# Patient Record
Sex: FEMALE | Race: WHITE | NOT HISPANIC OR LATINO | Employment: FULL TIME | RURAL
[De-identification: names, ages, dates, MRNs, and addresses within clinical notes are randomized per-mention and may not be internally consistent; named-entity substitution may affect disease eponyms.]

---

## 2017-02-22 ENCOUNTER — HISTORICAL (OUTPATIENT)
Dept: ADMINISTRATIVE | Facility: HOSPITAL | Age: 47
End: 2017-02-22

## 2017-02-24 LAB
LAB AP GENERAL CAT - HISTORICAL: NORMAL
LAB AP INTERPRETATION/RESULT - HISTORICAL: NEGATIVE
LAB AP SPECIMEN ADEQUACY - HISTORICAL: NORMAL
LAB AP SPECIMEN SUBMITTED - HISTORICAL: NORMAL

## 2019-02-27 ENCOUNTER — HISTORICAL (OUTPATIENT)
Dept: ADMINISTRATIVE | Facility: HOSPITAL | Age: 49
End: 2019-02-27

## 2020-03-03 ENCOUNTER — HISTORICAL (OUTPATIENT)
Dept: ADMINISTRATIVE | Facility: HOSPITAL | Age: 50
End: 2020-03-03

## 2020-03-09 LAB
INSULIN SERPL-ACNC: ABNORMAL U[IU]/ML
LAB AP CLINICAL INFORMATION: NORMAL
LAB AP COMMENTS: NORMAL
LAB AP GENERAL CAT - HISTORICAL: NORMAL
LAB AP INTERPRETATION/RESULT - HISTORICAL: NEGATIVE
LAB AP SPECIMEN ADEQUACY - HISTORICAL: NORMAL
LAB AP SPECIMEN SUBMITTED - HISTORICAL: NORMAL

## 2021-03-02 ENCOUNTER — HISTORICAL (OUTPATIENT)
Dept: ADMINISTRATIVE | Facility: HOSPITAL | Age: 51
End: 2021-03-02

## 2021-03-02 LAB
25(OH)D3 SERPL-MCNC: 40 NG/ML
BASOPHILS # BLD AUTO: 0.07 X10E3/UL (ref 0–0.2)
BASOPHILS NFR BLD AUTO: 0.7 % (ref 0–1)
CANDIDA SPECIES: POSITIVE
EOSINOPHIL # BLD AUTO: 0.13 X10E3/UL (ref 0–0.5)
EOSINOPHIL NFR BLD AUTO: 1.3 % (ref 1–4)
ERYTHROCYTE [DISTWIDTH] IN BLOOD BY AUTOMATED COUNT: 13.2 % (ref 11.5–14.5)
GARDNERELLA: NEGATIVE
HCT VFR BLD AUTO: 45.3 % (ref 38–47)
HGB BLD-MCNC: 14.4 G/DL (ref 12–16)
IMM GRANULOCYTES # BLD AUTO: 0.03 X10E3/UL (ref 0–0.04)
IMM GRANULOCYTES NFR BLD: 0.3 % (ref 0–0.4)
LYMPHOCYTES # BLD AUTO: 3.2 X10E3/UL (ref 1–4.8)
LYMPHOCYTES NFR BLD AUTO: 32.9 % (ref 27–41)
MCH RBC QN AUTO: 28.5 PG (ref 27–31)
MCHC RBC AUTO-ENTMCNC: 31.8 G/DL (ref 32–36)
MCV RBC AUTO: 89.5 FL (ref 80–96)
MONOCYTES # BLD AUTO: 0.6 X10E3/UL (ref 0–0.8)
MONOCYTES NFR BLD AUTO: 6.2 % (ref 2–6)
MPC BLD CALC-MCNC: 12.2 FL (ref 9.4–12.4)
NEUTROPHILS # BLD AUTO: 5.69 X10E3/UL (ref 1.8–7.7)
NEUTROPHILS NFR BLD AUTO: 58.6 % (ref 53–65)
NRBC # BLD AUTO: 0 X10E3/UL (ref 0–0)
NRBC, AUTO (.00): 0 /100 (ref 0–0)
PLATELET # BLD AUTO: 281 X10E3/UL (ref 150–400)
RBC # BLD AUTO: 5.06 X10E6/UL (ref 4.2–5.4)
TRICHOMONAS: NEGATIVE
WBC # BLD AUTO: 9.72 X10E3/UL (ref 4.5–11)

## 2021-03-03 LAB
ALBUMIN SERPL BCP-MCNC: 4.3 G/DL (ref 3.5–5)
ALBUMIN/GLOB SERPL: 1.3 {RATIO}
ALP SERPL-CCNC: 70 U/L (ref 41–108)
ALT SERPL W P-5'-P-CCNC: 23 U/L (ref 13–56)
ANION GAP SERPL CALCULATED.3IONS-SCNC: 9 MMOL/L (ref 7–16)
AST SERPL W P-5'-P-CCNC: 13 U/L (ref 15–37)
BACTERIA #/AREA URNS HPF: ABNORMAL /HPF
BILIRUB SERPL-MCNC: 0.3 MG/DL (ref 0–1.2)
BILIRUB UR QL STRIP: NEGATIVE MG/DL
BUN SERPL-MCNC: 14 MG/DL (ref 7–18)
BUN/CREAT SERPL: 18
CALCIUM SERPL-MCNC: 8.7 MG/DL (ref 8.5–10.1)
CHLORIDE SERPL-SCNC: 105 MMOL/L (ref 98–107)
CHOLEST SERPL-MCNC: 239 MG/DL
CHOLEST/HDLC SERPL: 3.3 {RATIO}
CLARITY UR: CLEAR
CO2 SERPL-SCNC: 29 MMOL/L (ref 21–32)
COLOR UR: YELLOW
CREAT SERPL-MCNC: 0.79 MG/DL (ref 0.5–1.02)
GLOBULIN SER-MCNC: 3.2 G/DL (ref 2–4)
GLUCOSE SERPL-MCNC: 75 MG/DL (ref 74–106)
GLUCOSE UR STRIP-MCNC: NEGATIVE MG/DL
HDLC SERPL-MCNC: 73 MG/DL
KETONES UR STRIP-SCNC: NEGATIVE MG/DL
LDLC SERPL CALC-MCNC: 148 MG/DL
LEUKOCYTE ESTERASE UR QL STRIP: ABNORMAL LEU/UL
MUCOUS THREADS #/AREA URNS HPF: ABNORMAL /HPF
NITRITE UR QL STRIP: NEGATIVE
PH UR STRIP: 6.5 PH UNITS (ref 5–8)
POTASSIUM SERPL-SCNC: 4.3 MMOL/L (ref 3.5–5.1)
PROT SERPL-MCNC: 7.5 G/DL (ref 6.4–8.2)
PROT UR QL STRIP: NEGATIVE MG/DL
RBC # UR STRIP: ABNORMAL ERY/UL
RBC #/AREA URNS HPF: ABNORMAL /HPF (ref 0–3)
SODIUM SERPL-SCNC: 139 MMOL/L (ref 136–145)
SP GR UR STRIP: 1.01 (ref 1–1.03)
SQUAMOUS #/AREA URNS LPF: ABNORMAL /LPF
T4 FREE SERPL-MCNC: 0.93 NG/DL (ref 0.76–1.46)
TRICHOMONAS #/AREA URNS HPF: ABNORMAL /HPF
TRIGL SERPL-MCNC: 92 MG/DL
TSH SERPL DL<=0.005 MIU/L-ACNC: 2.98 UIU/ML (ref 0.36–3.74)
UROBILINOGEN UR STRIP-ACNC: 0.2 EU/DL
WBC #/AREA URNS HPF: ABNORMAL /HPF (ref 0–5)
YEAST #/AREA URNS HPF: ABNORMAL /HPF

## 2021-03-08 LAB
INSULIN SERPL-ACNC: NORMAL U[IU]/ML
LAB AP COMMENTS: NORMAL
LAB AP GENERAL CAT - HISTORICAL: NORMAL
LAB AP INTERPRETATION/RESULT - HISTORICAL: NEGATIVE
LAB AP SPECIMEN ADEQUACY - HISTORICAL: NORMAL
LAB AP SPECIMEN SUBMITTED - HISTORICAL: NORMAL

## 2021-04-08 ENCOUNTER — HOSPITAL ENCOUNTER (OUTPATIENT)
Dept: RADIOLOGY | Facility: HOSPITAL | Age: 51
Discharge: HOME | End: 2021-04-08
Attending: RADIOLOGY
Payer: COMMERCIAL

## 2021-04-08 ENCOUNTER — HOSPITAL ENCOUNTER (OUTPATIENT)
Dept: RADIOLOGY | Facility: HOSPITAL | Age: 51
Discharge: HOME | End: 2021-04-08
Payer: COMMERCIAL

## 2021-04-08 VITALS — HEIGHT: 60 IN | WEIGHT: 128 LBS | BODY MASS INDEX: 25.13 KG/M2

## 2021-04-08 DIAGNOSIS — R92.8 ABNORMAL MAMMOGRAM: ICD-10-CM

## 2021-04-08 DIAGNOSIS — Z12.31 VISIT FOR SCREENING MAMMOGRAM: ICD-10-CM

## 2021-04-08 PROCEDURE — 76641 ULTRASOUND BREAST COMPLETE: CPT | Mod: 26,LT,, | Performed by: RADIOLOGY

## 2021-04-08 PROCEDURE — 76641 US BREAST BILATERAL COMPLETE: ICD-10-PCS | Mod: 26,RT,, | Performed by: RADIOLOGY

## 2021-04-08 PROCEDURE — 76641 ULTRASOUND BREAST COMPLETE: CPT | Mod: 26,RT,, | Performed by: RADIOLOGY

## 2021-04-08 PROCEDURE — 77067 SCR MAMMO BI INCL CAD: CPT | Mod: TC

## 2021-04-08 PROCEDURE — 77067 MAMMO DIGITAL SCREENING BILAT: ICD-10-PCS | Mod: 26,,, | Performed by: RADIOLOGY

## 2021-04-08 PROCEDURE — 77067 SCR MAMMO BI INCL CAD: CPT | Mod: 26,,, | Performed by: RADIOLOGY

## 2021-04-08 PROCEDURE — 76641 ULTRASOUND BREAST COMPLETE: CPT | Mod: TC,50

## 2021-09-28 ENCOUNTER — OFFICE VISIT (OUTPATIENT)
Dept: FAMILY MEDICINE | Facility: CLINIC | Age: 51
End: 2021-09-28
Payer: COMMERCIAL

## 2021-09-28 VITALS
OXYGEN SATURATION: 99 % | HEART RATE: 74 BPM | WEIGHT: 128 LBS | TEMPERATURE: 98 F | DIASTOLIC BLOOD PRESSURE: 80 MMHG | HEIGHT: 60 IN | SYSTOLIC BLOOD PRESSURE: 130 MMHG | BODY MASS INDEX: 25.13 KG/M2

## 2021-09-28 DIAGNOSIS — H61.22 LEFT EAR IMPACTED CERUMEN: Primary | ICD-10-CM

## 2021-09-28 PROCEDURE — 99202 PR OFFICE/OUTPT VISIT, NEW, LEVL II, 15-29 MIN: ICD-10-PCS | Mod: ,,, | Performed by: FAMILY MEDICINE

## 2021-09-28 PROCEDURE — 99202 OFFICE O/P NEW SF 15 MIN: CPT | Mod: ,,, | Performed by: FAMILY MEDICINE

## 2021-09-28 PROCEDURE — 3079F DIAST BP 80-89 MM HG: CPT | Mod: ,,, | Performed by: FAMILY MEDICINE

## 2021-09-28 PROCEDURE — 3008F BODY MASS INDEX DOCD: CPT | Mod: ,,, | Performed by: FAMILY MEDICINE

## 2021-09-28 PROCEDURE — 3075F SYST BP GE 130 - 139MM HG: CPT | Mod: ,,, | Performed by: FAMILY MEDICINE

## 2021-09-28 PROCEDURE — 3079F PR MOST RECENT DIASTOLIC BLOOD PRESSURE 80-89 MM HG: ICD-10-PCS | Mod: ,,, | Performed by: FAMILY MEDICINE

## 2021-09-28 PROCEDURE — 1159F PR MEDICATION LIST DOCUMENTED IN MEDICAL RECORD: ICD-10-PCS | Mod: ,,, | Performed by: FAMILY MEDICINE

## 2021-09-28 PROCEDURE — 1160F RVW MEDS BY RX/DR IN RCRD: CPT | Mod: ,,, | Performed by: FAMILY MEDICINE

## 2021-09-28 PROCEDURE — 1160F PR REVIEW ALL MEDS BY PRESCRIBER/CLIN PHARMACIST DOCUMENTED: ICD-10-PCS | Mod: ,,, | Performed by: FAMILY MEDICINE

## 2021-09-28 PROCEDURE — 3008F PR BODY MASS INDEX (BMI) DOCUMENTED: ICD-10-PCS | Mod: ,,, | Performed by: FAMILY MEDICINE

## 2021-09-28 PROCEDURE — 1159F MED LIST DOCD IN RCRD: CPT | Mod: ,,, | Performed by: FAMILY MEDICINE

## 2021-09-28 PROCEDURE — 3075F PR MOST RECENT SYSTOLIC BLOOD PRESS GE 130-139MM HG: ICD-10-PCS | Mod: ,,, | Performed by: FAMILY MEDICINE

## 2021-09-28 RX ORDER — ESTRADIOL 2 MG/1
2 TABLET ORAL DAILY
COMMUNITY
Start: 2021-09-23 | End: 2022-04-19 | Stop reason: SDUPTHER

## 2021-09-29 ENCOUNTER — OFFICE VISIT (OUTPATIENT)
Dept: OTOLARYNGOLOGY | Facility: CLINIC | Age: 51
End: 2021-09-29
Payer: COMMERCIAL

## 2021-09-29 VITALS — BODY MASS INDEX: 25.13 KG/M2 | WEIGHT: 128 LBS | HEIGHT: 60 IN

## 2021-09-29 DIAGNOSIS — H61.22 IMPACTED CERUMEN OF LEFT EAR: Primary | ICD-10-CM

## 2021-09-29 PROCEDURE — 99204 OFFICE O/P NEW MOD 45 MIN: CPT | Mod: S$PBB,,, | Performed by: OTOLARYNGOLOGY

## 2021-09-29 PROCEDURE — 1159F PR MEDICATION LIST DOCUMENTED IN MEDICAL RECORD: ICD-10-PCS | Mod: ,,, | Performed by: OTOLARYNGOLOGY

## 2021-09-29 PROCEDURE — 99213 OFFICE O/P EST LOW 20 MIN: CPT | Mod: PBBFAC | Performed by: OTOLARYNGOLOGY

## 2021-09-29 PROCEDURE — 3008F PR BODY MASS INDEX (BMI) DOCUMENTED: ICD-10-PCS | Mod: ,,, | Performed by: OTOLARYNGOLOGY

## 2021-09-29 PROCEDURE — 3008F BODY MASS INDEX DOCD: CPT | Mod: ,,, | Performed by: OTOLARYNGOLOGY

## 2021-09-29 PROCEDURE — 1159F MED LIST DOCD IN RCRD: CPT | Mod: ,,, | Performed by: OTOLARYNGOLOGY

## 2021-09-29 PROCEDURE — 99204 PR OFFICE/OUTPT VISIT, NEW, LEVL IV, 45-59 MIN: ICD-10-PCS | Mod: S$PBB,,, | Performed by: OTOLARYNGOLOGY

## 2022-04-19 ENCOUNTER — OFFICE VISIT (OUTPATIENT)
Dept: OBSTETRICS AND GYNECOLOGY | Facility: CLINIC | Age: 52
End: 2022-04-19
Payer: COMMERCIAL

## 2022-04-19 VITALS
DIASTOLIC BLOOD PRESSURE: 88 MMHG | SYSTOLIC BLOOD PRESSURE: 152 MMHG | RESPIRATION RATE: 16 BRPM | HEIGHT: 60 IN | WEIGHT: 129 LBS | TEMPERATURE: 98 F | HEART RATE: 73 BPM | BODY MASS INDEX: 25.32 KG/M2

## 2022-04-19 DIAGNOSIS — E89.40 ASYMPTOMATIC POSTSURGICAL MENOPAUSE: ICD-10-CM

## 2022-04-19 DIAGNOSIS — Z01.419 ENCOUNTER FOR ANNUAL ROUTINE GYNECOLOGICAL EXAMINATION: Primary | ICD-10-CM

## 2022-04-19 DIAGNOSIS — E55.9 VITAMIN D DEFICIENCY: ICD-10-CM

## 2022-04-19 LAB
ALBUMIN SERPL BCP-MCNC: 3.8 G/DL (ref 3.5–5)
ALBUMIN/GLOB SERPL: 1 {RATIO}
ALP SERPL-CCNC: 70 U/L (ref 41–108)
ALT SERPL W P-5'-P-CCNC: 17 U/L (ref 13–56)
ANION GAP SERPL CALCULATED.3IONS-SCNC: 10 MMOL/L (ref 7–16)
AST SERPL W P-5'-P-CCNC: 13 U/L (ref 15–37)
BASOPHILS # BLD AUTO: 0.05 K/UL (ref 0–0.2)
BASOPHILS NFR BLD AUTO: 0.5 % (ref 0–1)
BILIRUB SERPL-MCNC: 0.2 MG/DL (ref 0–1.2)
BILIRUB UR QL STRIP: NEGATIVE
BUN SERPL-MCNC: 12 MG/DL (ref 7–18)
BUN/CREAT SERPL: 18 (ref 6–20)
CALCIUM SERPL-MCNC: 8.9 MG/DL (ref 8.5–10.1)
CHLORIDE SERPL-SCNC: 102 MMOL/L (ref 98–107)
CHOLEST SERPL-MCNC: 211 MG/DL (ref 0–200)
CHOLEST/HDLC SERPL: 3.6 {RATIO}
CLARITY UR: CLEAR
CO2 SERPL-SCNC: 29 MMOL/L (ref 21–32)
COLOR UR: YELLOW
CREAT SERPL-MCNC: 0.68 MG/DL (ref 0.55–1.02)
DIFFERENTIAL METHOD BLD: ABNORMAL
EOSINOPHIL # BLD AUTO: 0.13 K/UL (ref 0–0.5)
EOSINOPHIL NFR BLD AUTO: 1.4 % (ref 1–4)
ERYTHROCYTE [DISTWIDTH] IN BLOOD BY AUTOMATED COUNT: 13.3 % (ref 11.5–14.5)
GLOBULIN SER-MCNC: 3.7 G/DL (ref 2–4)
GLUCOSE SERPL-MCNC: 75 MG/DL (ref 74–106)
GLUCOSE UR STRIP-MCNC: NEGATIVE MG/DL
HCT VFR BLD AUTO: 44.9 % (ref 38–47)
HDLC SERPL-MCNC: 58 MG/DL (ref 40–60)
HGB BLD-MCNC: 14.4 G/DL (ref 12–16)
IMM GRANULOCYTES # BLD AUTO: 0.04 K/UL (ref 0–0.04)
IMM GRANULOCYTES NFR BLD: 0.4 % (ref 0–0.4)
KETONES UR STRIP-SCNC: NEGATIVE MG/DL
LDLC SERPL CALC-MCNC: 113 MG/DL
LDLC/HDLC SERPL: 1.9 {RATIO}
LEUKOCYTE ESTERASE UR QL STRIP: NEGATIVE
LYMPHOCYTES # BLD AUTO: 2.97 K/UL (ref 1–4.8)
LYMPHOCYTES NFR BLD AUTO: 31 % (ref 27–41)
MCH RBC QN AUTO: 28.7 PG (ref 27–31)
MCHC RBC AUTO-ENTMCNC: 32.1 G/DL (ref 32–36)
MCV RBC AUTO: 89.6 FL (ref 80–96)
MONOCYTES # BLD AUTO: 0.6 K/UL (ref 0–0.8)
MONOCYTES NFR BLD AUTO: 6.3 % (ref 2–6)
MPC BLD CALC-MCNC: 12 FL (ref 9.4–12.4)
NEUTROPHILS # BLD AUTO: 5.8 K/UL (ref 1.8–7.7)
NEUTROPHILS NFR BLD AUTO: 60.4 % (ref 53–65)
NITRITE UR QL STRIP: NEGATIVE
NONHDLC SERPL-MCNC: 153 MG/DL
NRBC # BLD AUTO: 0 X10E3/UL
NRBC, AUTO (.00): 0 %
PH UR STRIP: 8 PH UNITS
PLATELET # BLD AUTO: 277 K/UL (ref 150–400)
POTASSIUM SERPL-SCNC: 4.6 MMOL/L (ref 3.5–5.1)
PROT SERPL-MCNC: 7.5 G/DL (ref 6.4–8.2)
PROT UR QL STRIP: NEGATIVE
RBC # BLD AUTO: 5.01 M/UL (ref 4.2–5.4)
RBC # UR STRIP: ABNORMAL /UL
RBC #/AREA URNS HPF: ABNORMAL /HPF
SODIUM SERPL-SCNC: 136 MMOL/L (ref 136–145)
SP GR UR STRIP: 1.01
SQUAMOUS #/AREA URNS LPF: ABNORMAL /LPF
TRIGL SERPL-MCNC: 201 MG/DL (ref 35–150)
UROBILINOGEN UR STRIP-ACNC: 0.2 MG/DL
VLDLC SERPL-MCNC: 40 MG/DL
WBC # BLD AUTO: 9.59 K/UL (ref 4.5–11)
WBC #/AREA URNS HPF: ABNORMAL /HPF
YEAST #/AREA URNS HPF: ABNORMAL /HPF

## 2022-04-19 PROCEDURE — 81001 URINALYSIS AUTO W/SCOPE: CPT | Mod: ,,, | Performed by: CLINICAL MEDICAL LABORATORY

## 2022-04-19 PROCEDURE — 80061 LIPID PANEL: ICD-10-PCS | Mod: ,,, | Performed by: CLINICAL MEDICAL LABORATORY

## 2022-04-19 PROCEDURE — 80061 LIPID PANEL: CPT | Mod: ,,, | Performed by: CLINICAL MEDICAL LABORATORY

## 2022-04-19 PROCEDURE — 3079F PR MOST RECENT DIASTOLIC BLOOD PRESSURE 80-89 MM HG: ICD-10-PCS | Mod: ,,, | Performed by: OBSTETRICS & GYNECOLOGY

## 2022-04-19 PROCEDURE — 80053 COMPREHEN METABOLIC PANEL: CPT | Mod: ,,, | Performed by: CLINICAL MEDICAL LABORATORY

## 2022-04-19 PROCEDURE — 85025 COMPLETE CBC W/AUTO DIFF WBC: CPT | Mod: ,,, | Performed by: CLINICAL MEDICAL LABORATORY

## 2022-04-19 PROCEDURE — 36415 PR COLLECTION VENOUS BLOOD,VENIPUNCTURE: ICD-10-PCS | Mod: ,,, | Performed by: OBSTETRICS & GYNECOLOGY

## 2022-04-19 PROCEDURE — 3008F PR BODY MASS INDEX (BMI) DOCUMENTED: ICD-10-PCS | Mod: ,,, | Performed by: OBSTETRICS & GYNECOLOGY

## 2022-04-19 PROCEDURE — 3077F SYST BP >= 140 MM HG: CPT | Mod: ,,, | Performed by: OBSTETRICS & GYNECOLOGY

## 2022-04-19 PROCEDURE — 3008F BODY MASS INDEX DOCD: CPT | Mod: ,,, | Performed by: OBSTETRICS & GYNECOLOGY

## 2022-04-19 PROCEDURE — 3079F DIAST BP 80-89 MM HG: CPT | Mod: ,,, | Performed by: OBSTETRICS & GYNECOLOGY

## 2022-04-19 PROCEDURE — 99396 PREV VISIT EST AGE 40-64: CPT | Mod: ,,, | Performed by: OBSTETRICS & GYNECOLOGY

## 2022-04-19 PROCEDURE — 81001 URINALYSIS: ICD-10-PCS | Mod: ,,, | Performed by: CLINICAL MEDICAL LABORATORY

## 2022-04-19 PROCEDURE — 1159F PR MEDICATION LIST DOCUMENTED IN MEDICAL RECORD: ICD-10-PCS | Mod: ,,, | Performed by: OBSTETRICS & GYNECOLOGY

## 2022-04-19 PROCEDURE — 1159F MED LIST DOCD IN RCRD: CPT | Mod: ,,, | Performed by: OBSTETRICS & GYNECOLOGY

## 2022-04-19 PROCEDURE — 36415 COLL VENOUS BLD VENIPUNCTURE: CPT | Mod: ,,, | Performed by: OBSTETRICS & GYNECOLOGY

## 2022-04-19 PROCEDURE — 1160F PR REVIEW ALL MEDS BY PRESCRIBER/CLIN PHARMACIST DOCUMENTED: ICD-10-PCS | Mod: ,,, | Performed by: OBSTETRICS & GYNECOLOGY

## 2022-04-19 PROCEDURE — 3077F PR MOST RECENT SYSTOLIC BLOOD PRESSURE >= 140 MM HG: ICD-10-PCS | Mod: ,,, | Performed by: OBSTETRICS & GYNECOLOGY

## 2022-04-19 PROCEDURE — 85025 CBC WITH DIFFERENTIAL: ICD-10-PCS | Mod: ,,, | Performed by: CLINICAL MEDICAL LABORATORY

## 2022-04-19 PROCEDURE — 80053 COMPREHENSIVE METABOLIC PANEL: ICD-10-PCS | Mod: ,,, | Performed by: CLINICAL MEDICAL LABORATORY

## 2022-04-19 PROCEDURE — 99396 PR PREVENTIVE VISIT,EST,40-64: ICD-10-PCS | Mod: ,,, | Performed by: OBSTETRICS & GYNECOLOGY

## 2022-04-19 PROCEDURE — 1160F RVW MEDS BY RX/DR IN RCRD: CPT | Mod: ,,, | Performed by: OBSTETRICS & GYNECOLOGY

## 2022-04-19 RX ORDER — ESTRADIOL 2 MG/1
2 TABLET ORAL DAILY
Qty: 90 TABLET | Refills: 3 | Status: SHIPPED | OUTPATIENT
Start: 2022-04-19 | End: 2023-04-19 | Stop reason: SDUPTHER

## 2022-04-19 NOTE — PATIENT INSTRUCTIONS
Dr. Deshawn Lopez thanks you for this annual exam visit at [facility].    Please remember to perform monthly breast self exams. If you are over 40 years of age, Dr. Lopez recommends yearly mammograms. Please check with your insurance carrier for mammogram insurance coverage.    Colonoscopy indication:      Low risk family history: schedule after age 50 for initial evaluation by a GI specialist.    High risk family history: Schedule before age 50 for initial evaluation by a GI specialist. High risk family history includes history of colon cancer in an offspring, brother or sister or parent.    The Annual Exam or periodic exam may includes thin prep Pap smear and appropriate ancillary labs as allowed by your insurance coverage.The studies Dr. Deshawn Lopez ordered has been checked by our [email or web address] Electronic Medical Record software today.     Please use perscribed medications as directed.    Special considerations after this visit:  Routine screening labs; recommend mammography soon; renew her estrogen replacement therapy    Please practice best food and exercise habits for your age.    Dr. Deshawn Lopez recommends avoidance of smoking and illicit medications or habits.    Please call or schedule for any change in your health.     Dr. Deshawn Lopez recommends yearly exams for good health maintenance even if you pap smear schedule (as allowed by your health insurance coverage)  does not allow yearly pap testing.    Dr. Deshawn Lopez    04/19/2022 2:50 PM

## 2022-04-19 NOTE — PROGRESS NOTES
Leidyflora Castellano female  for   Chief Complaint   Patient presents with    Well Woman     Well woman exam without pap      OB History        1    Para   1    Term   1            AB        Living   1       SAB        IAB        Ectopic        Multiple        Live Births                      PHI:  52-year-old  female presents for an annual exam without any gynecologic problems.  She does need renewal of her estrogen replacement therapy.  She has no hot flashes.  She has no dyspareunia.  She has no urologic symptoms.  Her last mammogram was 2021.  Her last Pap smear was 2021.    Patient has had natural immunity from an active COVID-19 infection in . She has had no vaccinations.    She is on no vitamin-D replacement.        History reviewed. No pertinent past medical history.   Past Surgical History:   Procedure Laterality Date    HYSTERECTOMY        Review of patient's allergies indicates:  No Known Allergies     ROS:Pertinent items are noted in HPI.    Physical exam:    BP (!) 152/88 (BP Location: Left arm, Patient Position: Sitting)   Pulse 73   Temp 97.8 °F (36.6 °C)   Resp 16   Ht 5' (1.524 m)   Wt 58.5 kg (129 lb)   BMI 25.19 kg/m²      General Appearance: healthy, alert, no distress, smiling               HEENT: Head: Normocephalic, no lesions, without obvious abnormality.    Neuro: normal exam    Lymphatic: no palpable lymphadenopathy, no hepatosplenomegaly    Chest:            Breast: normal appearance, no masses or tenderness, Inspection negative, No nipple retraction or dimpling, No nipple discharge or bleeding, No axillary or supraclavicular adenopathy, Normal to palpation without dominant masses            Lung: chest clear, no wheezing, rales, normal symmetric air entry            Heart: regular rate and rhythm, S1, S2 normal, no murmur, click, rub or gallop    Abdomen: soft, non-tender, without masses or organomegaly, normal bowel sounds, without guarding  and without rebound    Pelvic:            Vulva: Normal vulva: no lesions, masses, epithelial changes, or exudate, shaved           Vagina: normal mucosa, no discharge, excellent post hysterectomy vaginal vault support with no cystocele, no enterocele and no rectocele           Uterus: Uterus / cervix surgically absent           Adnexae: normal adnexa and no mass, fullness, tenderness    Rectal: negative, stool guaiac negative    Extremity: normal    Skin: normal exam        Assessment:   Problem List Items Addressed This Visit    None     Visit Diagnoses     Encounter for annual routine gynecological examination    -  Primary    Asymptomatic postsurgical menopause        Vitamin D deficiency               Plan:  Screening labs; no thin prep Pap; Hemoccult screen was negative; check vitamin-D                Recommend monthly breast self-exam and mammography this year; renew her estrogen replacement therapy for this year              Recommend vitamin D3 2000 units daily.  This is over-the-counter medication.

## 2022-06-06 ENCOUNTER — HOSPITAL ENCOUNTER (OUTPATIENT)
Dept: RADIOLOGY | Facility: HOSPITAL | Age: 52
Discharge: HOME | End: 2022-06-06
Payer: COMMERCIAL

## 2022-06-06 DIAGNOSIS — Z12.31 VISIT FOR SCREENING MAMMOGRAM: ICD-10-CM

## 2022-06-06 PROCEDURE — 77067 MAMMO DIGITAL SCREENING BILAT: ICD-10-PCS | Mod: 26,,, | Performed by: RADIOLOGY

## 2022-06-06 PROCEDURE — 77067 SCR MAMMO BI INCL CAD: CPT | Mod: TC

## 2022-06-06 PROCEDURE — 77067 SCR MAMMO BI INCL CAD: CPT | Mod: 26,,, | Performed by: RADIOLOGY

## 2022-09-15 ENCOUNTER — OFFICE VISIT (OUTPATIENT)
Dept: FAMILY MEDICINE | Facility: CLINIC | Age: 52
End: 2022-09-15
Payer: COMMERCIAL

## 2022-09-15 VITALS
DIASTOLIC BLOOD PRESSURE: 82 MMHG | SYSTOLIC BLOOD PRESSURE: 138 MMHG | TEMPERATURE: 98 F | WEIGHT: 129 LBS | HEIGHT: 60 IN | OXYGEN SATURATION: 97 % | HEART RATE: 97 BPM | BODY MASS INDEX: 25.32 KG/M2 | RESPIRATION RATE: 18 BRPM

## 2022-09-15 DIAGNOSIS — H10.89 OTHER CONJUNCTIVITIS OF RIGHT EYE: Primary | ICD-10-CM

## 2022-09-15 DIAGNOSIS — H57.89 IRRITATION OF RIGHT EYE: ICD-10-CM

## 2022-09-15 PROCEDURE — 3079F PR MOST RECENT DIASTOLIC BLOOD PRESSURE 80-89 MM HG: ICD-10-PCS | Mod: ,,, | Performed by: NURSE PRACTITIONER

## 2022-09-15 PROCEDURE — 99213 OFFICE O/P EST LOW 20 MIN: CPT | Mod: ,,, | Performed by: NURSE PRACTITIONER

## 2022-09-15 PROCEDURE — 3008F PR BODY MASS INDEX (BMI) DOCUMENTED: ICD-10-PCS | Mod: ,,, | Performed by: NURSE PRACTITIONER

## 2022-09-15 PROCEDURE — 3075F SYST BP GE 130 - 139MM HG: CPT | Mod: ,,, | Performed by: NURSE PRACTITIONER

## 2022-09-15 PROCEDURE — 1159F PR MEDICATION LIST DOCUMENTED IN MEDICAL RECORD: ICD-10-PCS | Mod: ,,, | Performed by: NURSE PRACTITIONER

## 2022-09-15 PROCEDURE — 1160F RVW MEDS BY RX/DR IN RCRD: CPT | Mod: ,,, | Performed by: NURSE PRACTITIONER

## 2022-09-15 PROCEDURE — 3079F DIAST BP 80-89 MM HG: CPT | Mod: ,,, | Performed by: NURSE PRACTITIONER

## 2022-09-15 PROCEDURE — 99213 PR OFFICE/OUTPT VISIT, EST, LEVL III, 20-29 MIN: ICD-10-PCS | Mod: ,,, | Performed by: NURSE PRACTITIONER

## 2022-09-15 PROCEDURE — 1160F PR REVIEW ALL MEDS BY PRESCRIBER/CLIN PHARMACIST DOCUMENTED: ICD-10-PCS | Mod: ,,, | Performed by: NURSE PRACTITIONER

## 2022-09-15 PROCEDURE — 3075F PR MOST RECENT SYSTOLIC BLOOD PRESS GE 130-139MM HG: ICD-10-PCS | Mod: ,,, | Performed by: NURSE PRACTITIONER

## 2022-09-15 PROCEDURE — 3008F BODY MASS INDEX DOCD: CPT | Mod: ,,, | Performed by: NURSE PRACTITIONER

## 2022-09-15 PROCEDURE — 1159F MED LIST DOCD IN RCRD: CPT | Mod: ,,, | Performed by: NURSE PRACTITIONER

## 2022-09-15 RX ORDER — GENTAMICIN SULFATE 3 MG/ML
1 SOLUTION/ DROPS OPHTHALMIC EVERY 4 HOURS
Qty: 5 ML | Refills: 0 | Status: SHIPPED | OUTPATIENT
Start: 2022-09-15

## 2022-09-15 NOTE — PROGRESS NOTES
[facility]    Chief Complaint      Chief Complaint   Patient presents with    Eye Problem     Red, itchy x 2 days.Right eye  Otc eye drops used for irrritation     History of Present Illness      Leidy Castellano is a 52 y.o. female who presents today for c/o discharge and irritation to right eye x2 days.    Eye Problem   The right eye is affected. This is a new problem. The current episode started yesterday. The problem occurs constantly. The problem has been gradually worsening. There was no injury mechanism. There is No known exposure to pink eye. She Does not wear contacts. Associated symptoms include blurred vision, an eye discharge, eye redness, a foreign body sensation and itching. Pertinent negatives include no double vision, fever, nausea, photophobia, recent URI or vomiting. She has tried eye drops for the symptoms. The treatment provided no relief.     Past Medical History:  History reviewed. No pertinent past medical history.    Past Surgical History:   has a past surgical history that includes Hysterectomy.    Social History:  Social History     Tobacco Use    Smoking status: Every Day    Smokeless tobacco: Never   Substance Use Topics    Alcohol use: Not Currently    Drug use: Never     I personally reviewed all past medical, surgical, and social.     Review of Systems   Constitutional:  Negative for fever and malaise/fatigue.   HENT:  Negative for congestion, sinus pain and sore throat.    Eyes:  Positive for blurred vision, discharge, redness and itching. Negative for double vision, photophobia and pain.   Respiratory:  Negative for cough, sputum production, shortness of breath and wheezing.    Gastrointestinal:  Negative for nausea and vomiting.   Musculoskeletal:  Negative for myalgias.   Skin:  Negative for itching and rash.   Neurological:  Negative for dizziness and headaches.   Endo/Heme/Allergies:  Negative for environmental allergies. Does not bruise/bleed easily.    Psychiatric/Behavioral:  Negative for depression and suicidal ideas.       Medications:  Outpatient Encounter Medications as of 9/15/2022   Medication Sig Dispense Refill    estradioL (ESTRACE) 2 MG tablet Take 1 tablet (2 mg total) by mouth once daily. 90 tablet 3    gentamicin (GARAMYCIN) 0.3 % ophthalmic solution Place 1 drop into the right eye every 4 (four) hours. 5 mL 0     No facility-administered encounter medications on file as of 9/15/2022.     Allergies:  Review of patient's allergies indicates:  No Known Allergies    Health Maintenance:    There is no immunization history on file for this patient.   Health Maintenance   Topic Date Due    Hepatitis C Screening  Never done    TETANUS VACCINE  Never done    Mammogram  06/06/2023    Lipid Panel  04/19/2027      Physical Exam      Vital Signs  Temp: 98.4 °F (36.9 °C)  Pulse: 97  Resp: 18  SpO2: 97 %  BP: 138/82  Pain Score:   2  Height and Weight  Height: 5' (152.4 cm)  Weight: 58.5 kg (129 lb)  BSA (Calculated - sq m): 1.57 sq meters  BMI (Calculated): 25.2  Weight in (lb) to have BMI = 25: 127.7]    Physical Exam  Vitals and nursing note reviewed.   Constitutional:       General: She is not in acute distress.     Appearance: Normal appearance.   HENT:      Head: Normocephalic and atraumatic.      Right Ear: External ear normal.      Left Ear: External ear normal.   Eyes:      General: Vision grossly intact.         Right eye: Discharge present.      Extraocular Movements: Extraocular movements intact.      Conjunctiva/sclera:      Right eye: Right conjunctiva is injected.      Pupils: Pupils are equal, round, and reactive to light.   Cardiovascular:      Rate and Rhythm: Normal rate and regular rhythm.      Heart sounds: Normal heart sounds. No murmur heard.  Pulmonary:      Effort: Pulmonary effort is normal. No respiratory distress.      Breath sounds: Normal breath sounds.   Musculoskeletal:         General: Normal range of motion.      Cervical back:  Normal range of motion.   Skin:     General: Skin is warm.      Findings: No rash.   Neurological:      General: No focal deficit present.      Mental Status: She is alert and oriented to person, place, and time. Mental status is at baseline.      Cranial Nerves: No cranial nerve deficit.      Gait: Gait normal.   Psychiatric:         Mood and Affect: Mood normal.         Behavior: Behavior normal.         Thought Content: Thought content normal.         Judgment: Judgment normal.      Laboratory:  CBC:  Recent Labs   Lab 03/02/21  1445 04/19/22  1503   WBC 9.72 9.59   RBC 5.06 5.01   Hemoglobin 14.4 14.4   Hematocrit 45.3 44.9   Platelet Count 281 277   MCV 89.5 89.6   MCH 28.5 28.7   MCHC 31.8 L 32.1     CMP:  Recent Labs   Lab 03/02/21  1445 04/19/22  1503   Glucose 75 75   Calcium 8.7 8.9   Albumin 4.3 3.8   Total Protein 7.5 7.5   Sodium 139 136   Potassium 4.3 4.6   CO2 29 29   Chloride 105 102   BUN 14 12   Alk Phos 70 70   ALT 23 17   AST 13 L 13 L   Bilirubin, Total 0.3 0.2     LIPIDS:  Recent Labs   Lab 03/02/21  1445 04/19/22  1503   TSH 2.980  --    HDL Cholesterol 73 58   Cholesterol 239 211 H   Triglycerides 92 201 H   LDL Calculated 148 113   Cholesterol/HDL Ratio (Risk Factor) 3.3 3.6   Non-HDL  --  153     TSH:  Recent Labs   Lab 03/02/21  1445   TSH 2.980     Assessment/Plan     Leidy Castellano is a 52 y.o.female with:    1. Other conjunctivitis of right eye  - gentamicin (GARAMYCIN) 0.3 % ophthalmic solution; Place 1 drop into the right eye every 4 (four) hours.  Dispense: 5 mL; Refill: 0    2. Irritation of right eye  - Visual acuity screening     Chronic conditions status updated as per HPI.  Other than changes above, cont current medications and maintain follow up with specialists.  Return to clinic as needed.     Dianne Apple, P  [facility]

## 2022-09-22 ENCOUNTER — OFFICE VISIT (OUTPATIENT)
Dept: FAMILY MEDICINE | Facility: CLINIC | Age: 52
End: 2022-09-22
Payer: COMMERCIAL

## 2022-09-22 VITALS
BODY MASS INDEX: 25.32 KG/M2 | WEIGHT: 129 LBS | TEMPERATURE: 99 F | DIASTOLIC BLOOD PRESSURE: 88 MMHG | HEART RATE: 104 BPM | HEIGHT: 60 IN | OXYGEN SATURATION: 97 % | SYSTOLIC BLOOD PRESSURE: 136 MMHG

## 2022-09-22 DIAGNOSIS — J00 NASOPHARYNGITIS: Primary | ICD-10-CM

## 2022-09-22 PROCEDURE — 3079F DIAST BP 80-89 MM HG: CPT | Mod: ,,, | Performed by: NURSE PRACTITIONER

## 2022-09-22 PROCEDURE — 99213 PR OFFICE/OUTPT VISIT, EST, LEVL III, 20-29 MIN: ICD-10-PCS | Mod: ,,, | Performed by: NURSE PRACTITIONER

## 2022-09-22 PROCEDURE — 99213 OFFICE O/P EST LOW 20 MIN: CPT | Mod: ,,, | Performed by: NURSE PRACTITIONER

## 2022-09-22 PROCEDURE — 1160F RVW MEDS BY RX/DR IN RCRD: CPT | Mod: ,,, | Performed by: NURSE PRACTITIONER

## 2022-09-22 PROCEDURE — 1159F MED LIST DOCD IN RCRD: CPT | Mod: ,,, | Performed by: NURSE PRACTITIONER

## 2022-09-22 PROCEDURE — 1159F PR MEDICATION LIST DOCUMENTED IN MEDICAL RECORD: ICD-10-PCS | Mod: ,,, | Performed by: NURSE PRACTITIONER

## 2022-09-22 PROCEDURE — 3075F SYST BP GE 130 - 139MM HG: CPT | Mod: ,,, | Performed by: NURSE PRACTITIONER

## 2022-09-22 PROCEDURE — 3079F PR MOST RECENT DIASTOLIC BLOOD PRESSURE 80-89 MM HG: ICD-10-PCS | Mod: ,,, | Performed by: NURSE PRACTITIONER

## 2022-09-22 PROCEDURE — 3008F PR BODY MASS INDEX (BMI) DOCUMENTED: ICD-10-PCS | Mod: ,,, | Performed by: NURSE PRACTITIONER

## 2022-09-22 PROCEDURE — 3008F BODY MASS INDEX DOCD: CPT | Mod: ,,, | Performed by: NURSE PRACTITIONER

## 2022-09-22 PROCEDURE — 3075F PR MOST RECENT SYSTOLIC BLOOD PRESS GE 130-139MM HG: ICD-10-PCS | Mod: ,,, | Performed by: NURSE PRACTITIONER

## 2022-09-22 PROCEDURE — 1160F PR REVIEW ALL MEDS BY PRESCRIBER/CLIN PHARMACIST DOCUMENTED: ICD-10-PCS | Mod: ,,, | Performed by: NURSE PRACTITIONER

## 2022-09-22 RX ORDER — CETIRIZINE HYDROCHLORIDE, PSEUDOEPHEDRINE HYDROCHLORIDE 5; 120 MG/1; MG/1
1 TABLET, FILM COATED, EXTENDED RELEASE ORAL 2 TIMES DAILY
Qty: 20 TABLET | Refills: 0 | Status: SHIPPED | OUTPATIENT
Start: 2022-09-22 | End: 2022-10-02

## 2022-09-22 NOTE — PROGRESS NOTES
[facility]    Chief Complaint      Chief Complaint   Patient presents with    Ear Fullness     left    Cough    Nasal Congestion     little    Sore Throat    Documentation Only     Started Tuesday; not vac       History of Present Illness      Leidy Castellano is a 52 y.o. female with no chronic conditions who presents today for URI type symptoms x2 days.    Past Medical History:  History reviewed. No pertinent past medical history.    Past Surgical History:   has a past surgical history that includes Hysterectomy.    Social History:  Social History     Tobacco Use    Smoking status: Every Day    Smokeless tobacco: Never   Substance Use Topics    Alcohol use: Not Currently    Drug use: Never       I personally reviewed all past medical, surgical, and social.     Review of Systems   Constitutional:  Negative for chills, fatigue and fever.   HENT:  Positive for congestion, ear pain, postnasal drip, rhinorrhea and sore throat. Negative for sneezing.    Respiratory:  Positive for cough.    Gastrointestinal:  Negative for diarrhea, nausea and vomiting.   Musculoskeletal:  Negative for myalgias.   Skin:  Negative for rash.   Neurological:  Negative for headaches.      Medications:  Outpatient Encounter Medications as of 9/22/2022   Medication Sig Dispense Refill    estradioL (ESTRACE) 2 MG tablet Take 1 tablet (2 mg total) by mouth once daily. 90 tablet 3    gentamicin (GARAMYCIN) 0.3 % ophthalmic solution Place 1 drop into the right eye every 4 (four) hours. 5 mL 0    cetirizine-pseudoephedrine 5-120 mg Tb12 Take 1 tablet by mouth 2 (two) times a day. for 10 days 20 tablet 0     No facility-administered encounter medications on file as of 9/22/2022.       Allergies:  Review of patient's allergies indicates:  No Known Allergies    Health Maintenance:    There is no immunization history on file for this patient.   Health Maintenance   Topic Date Due    Hepatitis C Screening  Never done    TETANUS VACCINE  Never  done    Mammogram  06/06/2023    Lipid Panel  04/19/2027        Physical Exam      Vital Signs  Temp: 99.3 °F (37.4 °C)  Temp src: Oral  Pulse: 104  SpO2: 97 %  BP: 136/88  Height and Weight  Height: 5' (152.4 cm)  Weight: 58.5 kg (129 lb)  BSA (Calculated - sq m): 1.57 sq meters  BMI (Calculated): 25.2  Weight in (lb) to have BMI = 25: 127.7]    Physical Exam  Vitals and nursing note reviewed.   Constitutional:       Appearance: Normal appearance. She is well-developed.   HENT:      Head: Normocephalic.      Right Ear: Hearing, tympanic membrane, ear canal and external ear normal.      Left Ear: Hearing and external ear normal. There is impacted cerumen.      Nose: Congestion present.      Right Turbinates: Swollen.      Mouth/Throat:      Lips: Pink.      Pharynx: Oropharynx is clear.   Eyes:      General: Lids are normal.      Conjunctiva/sclera: Conjunctivae normal.   Cardiovascular:      Rate and Rhythm: Normal rate and regular rhythm.      Heart sounds: Normal heart sounds.   Pulmonary:      Effort: Pulmonary effort is normal.      Breath sounds: Normal breath sounds.   Musculoskeletal:         General: Normal range of motion.      Cervical back: Normal range of motion and neck supple.   Skin:     General: Skin is warm and dry.   Neurological:      Mental Status: She is alert and oriented to person, place, and time.      Gait: Gait is intact.   Psychiatric:         Behavior: Behavior is cooperative.        Laboratory:  CBC:  Recent Labs   Lab 03/02/21  1445 04/19/22  1503   WBC 9.72 9.59   RBC 5.06 5.01   Hemoglobin 14.4 14.4   Hematocrit 45.3 44.9   Platelet Count 281 277   MCV 89.5 89.6   MCH 28.5 28.7   MCHC 31.8 L 32.1     CMP:  Recent Labs   Lab 03/02/21  1445 04/19/22  1503   Glucose 75 75   Calcium 8.7 8.9   Albumin 4.3 3.8   Total Protein 7.5 7.5   Sodium 139 136   Potassium 4.3 4.6   CO2 29 29   Chloride 105 102   BUN 14 12   Alk Phos 70 70   ALT 23 17   AST 13 L 13 L   Bilirubin, Total 0.3 0.2      LIPIDS:  Recent Labs   Lab 03/02/21  1445 04/19/22  1503   TSH 2.980  --    HDL Cholesterol 73 58   Cholesterol 239 211 H   Triglycerides 92 201 H   LDL Calculated 148 113   Cholesterol/HDL Ratio (Risk Factor) 3.3 3.6   Non-HDL  --  153     TSH:  Recent Labs   Lab 03/02/21  1445   TSH 2.980     A1C:        Assessment/Plan     Leidy Castellano is a 52 y.o.female with:     1. Nasopharyngitis  - cetirizine-pseudoephedrine 5-120 mg Tb12; Take 1 tablet by mouth 2 (two) times a day. for 10 days  Dispense: 20 tablet; Refill: 0     Patient declined Covid testing      Total time spent face-to-face and non-face-to-face coordinating care for this encounter was: 20 min    Chronic conditions status updated as per HPI.  Other than changes above, cont current medications and maintain follow up with specialists.  Return to clinic as needed.    KEY GranadosP  [facility]

## 2023-04-19 ENCOUNTER — OFFICE VISIT (OUTPATIENT)
Dept: OBSTETRICS AND GYNECOLOGY | Facility: CLINIC | Age: 53
End: 2023-04-19
Payer: COMMERCIAL

## 2023-04-19 VITALS
RESPIRATION RATE: 16 BRPM | BODY MASS INDEX: 25.4 KG/M2 | WEIGHT: 129.38 LBS | DIASTOLIC BLOOD PRESSURE: 90 MMHG | SYSTOLIC BLOOD PRESSURE: 130 MMHG | HEART RATE: 59 BPM | TEMPERATURE: 97 F | HEIGHT: 60 IN

## 2023-04-19 DIAGNOSIS — B37.31 YEAST VAGINITIS: ICD-10-CM

## 2023-04-19 DIAGNOSIS — E55.9 VITAMIN D DEFICIENCY: ICD-10-CM

## 2023-04-19 DIAGNOSIS — N89.8 VAGINAL DISCHARGE: ICD-10-CM

## 2023-04-19 DIAGNOSIS — Z01.419 ENCOUNTER FOR ANNUAL ROUTINE GYNECOLOGICAL EXAMINATION: Primary | ICD-10-CM

## 2023-04-19 DIAGNOSIS — Z78.0 MENOPAUSE: ICD-10-CM

## 2023-04-19 LAB
25(OH)D3 SERPL-MCNC: 26.2 NG/ML
ALBUMIN SERPL BCP-MCNC: 4 G/DL (ref 3.5–5)
ALBUMIN/GLOB SERPL: 1.1 {RATIO}
ALP SERPL-CCNC: 69 U/L (ref 46–118)
ALT SERPL W P-5'-P-CCNC: 21 U/L (ref 13–56)
ANION GAP SERPL CALCULATED.3IONS-SCNC: 4 MMOL/L (ref 7–16)
AST SERPL W P-5'-P-CCNC: 12 U/L (ref 15–37)
BASOPHILS # BLD AUTO: 0.06 K/UL (ref 0–0.2)
BASOPHILS NFR BLD AUTO: 0.5 % (ref 0–1)
BILIRUB SERPL-MCNC: 0.4 MG/DL (ref ?–1.2)
BUN SERPL-MCNC: 13 MG/DL (ref 7–18)
BUN/CREAT SERPL: 19 (ref 6–20)
CALCIUM SERPL-MCNC: 9.2 MG/DL (ref 8.5–10.1)
CANDIDA SPECIES: POSITIVE
CHLORIDE SERPL-SCNC: 108 MMOL/L (ref 98–107)
CHOLEST SERPL-MCNC: 218 MG/DL (ref 0–200)
CHOLEST/HDLC SERPL: 3.1 {RATIO}
CO2 SERPL-SCNC: 31 MMOL/L (ref 21–32)
CREAT SERPL-MCNC: 0.68 MG/DL (ref 0.55–1.02)
CTP QC/QA: YES
DIFFERENTIAL METHOD BLD: ABNORMAL
EGFR (NO RACE VARIABLE) (RUSH/TITUS): 103 ML/MIN/1.73M²
EOSINOPHIL # BLD AUTO: 0.17 K/UL (ref 0–0.5)
EOSINOPHIL NFR BLD AUTO: 1.5 % (ref 1–4)
ERYTHROCYTE [DISTWIDTH] IN BLOOD BY AUTOMATED COUNT: 13.2 % (ref 11.5–14.5)
EST. AVERAGE GLUCOSE BLD GHB EST-MCNC: 90 MG/DL
FECAL OCCULT BLOOD, POC: NEGATIVE
GARDNERELLA: NEGATIVE
GLOBULIN SER-MCNC: 3.5 G/DL (ref 2–4)
GLUCOSE SERPL-MCNC: 92 MG/DL (ref 74–106)
HBA1C MFR BLD HPLC: 5.3 % (ref 4.5–6.6)
HCT VFR BLD AUTO: 45.9 % (ref 38–47)
HDLC SERPL-MCNC: 70 MG/DL (ref 40–60)
HGB BLD-MCNC: 14.4 G/DL (ref 12–16)
IMM GRANULOCYTES # BLD AUTO: 0.04 K/UL (ref 0–0.04)
IMM GRANULOCYTES NFR BLD: 0.4 % (ref 0–0.4)
LDLC SERPL CALC-MCNC: 128 MG/DL
LDLC/HDLC SERPL: 1.8 {RATIO}
LYMPHOCYTES # BLD AUTO: 3.95 K/UL (ref 1–4.8)
LYMPHOCYTES NFR BLD AUTO: 35.7 % (ref 27–41)
MCH RBC QN AUTO: 27.9 PG (ref 27–31)
MCHC RBC AUTO-ENTMCNC: 31.4 G/DL (ref 32–36)
MCV RBC AUTO: 89 FL (ref 80–96)
MONOCYTES # BLD AUTO: 0.83 K/UL (ref 0–0.8)
MONOCYTES NFR BLD AUTO: 7.5 % (ref 2–6)
MPC BLD CALC-MCNC: 12 FL (ref 9.4–12.4)
NEUTROPHILS # BLD AUTO: 6.02 K/UL (ref 1.8–7.7)
NEUTROPHILS NFR BLD AUTO: 54.4 % (ref 53–65)
NONHDLC SERPL-MCNC: 148 MG/DL
NRBC # BLD AUTO: 0 X10E3/UL
NRBC, AUTO (.00): 0 %
PLATELET # BLD AUTO: 267 K/UL (ref 150–400)
POTASSIUM SERPL-SCNC: 4.3 MMOL/L (ref 3.5–5.1)
PROT SERPL-MCNC: 7.5 G/DL (ref 6.4–8.2)
RBC # BLD AUTO: 5.16 M/UL (ref 4.2–5.4)
SODIUM SERPL-SCNC: 139 MMOL/L (ref 136–145)
TRICHOMONAS: NEGATIVE
TRIGL SERPL-MCNC: 101 MG/DL (ref 35–150)
VLDLC SERPL-MCNC: 20 MG/DL
WBC # BLD AUTO: 11.07 K/UL (ref 4.5–11)

## 2023-04-19 PROCEDURE — 3080F PR MOST RECENT DIASTOLIC BLOOD PRESSURE >= 90 MM HG: ICD-10-PCS | Mod: ,,, | Performed by: OBSTETRICS & GYNECOLOGY

## 2023-04-19 PROCEDURE — 85025 CBC WITH DIFFERENTIAL: ICD-10-PCS | Mod: ,,, | Performed by: CLINICAL MEDICAL LABORATORY

## 2023-04-19 PROCEDURE — 87491 CHLAMYDIA/GC, PCR (THINPREP): ICD-10-PCS | Mod: ,,, | Performed by: CLINICAL MEDICAL LABORATORY

## 2023-04-19 PROCEDURE — 88142 CYTOPATH C/V THIN LAYER: CPT | Mod: TC,GCY | Performed by: OBSTETRICS & GYNECOLOGY

## 2023-04-19 PROCEDURE — 80053 COMPREHEN METABOLIC PANEL: CPT | Mod: ,,, | Performed by: CLINICAL MEDICAL LABORATORY

## 2023-04-19 PROCEDURE — 3075F SYST BP GE 130 - 139MM HG: CPT | Mod: ,,, | Performed by: OBSTETRICS & GYNECOLOGY

## 2023-04-19 PROCEDURE — 3008F BODY MASS INDEX DOCD: CPT | Mod: ,,, | Performed by: OBSTETRICS & GYNECOLOGY

## 2023-04-19 PROCEDURE — 82306 VITAMIN D 25 HYDROXY: CPT | Mod: ,,, | Performed by: CLINICAL MEDICAL LABORATORY

## 2023-04-19 PROCEDURE — 87480 CANDIDA DNA DIR PROBE: CPT | Mod: ,,, | Performed by: CLINICAL MEDICAL LABORATORY

## 2023-04-19 PROCEDURE — 87660 BACTERIAL VAGINOSIS: ICD-10-PCS | Mod: ,,, | Performed by: CLINICAL MEDICAL LABORATORY

## 2023-04-19 PROCEDURE — 87491 CHLMYD TRACH DNA AMP PROBE: CPT | Mod: ,,, | Performed by: CLINICAL MEDICAL LABORATORY

## 2023-04-19 PROCEDURE — 83036 HEMOGLOBIN GLYCOSYLATED A1C: CPT | Mod: ,,, | Performed by: CLINICAL MEDICAL LABORATORY

## 2023-04-19 PROCEDURE — 82270 OCCULT BLOOD FECES: CPT | Mod: PBBFAC | Performed by: OBSTETRICS & GYNECOLOGY

## 2023-04-19 PROCEDURE — 87510 GARDNER VAG DNA DIR PROBE: CPT | Mod: ,,, | Performed by: CLINICAL MEDICAL LABORATORY

## 2023-04-19 PROCEDURE — 1159F MED LIST DOCD IN RCRD: CPT | Mod: ,,, | Performed by: OBSTETRICS & GYNECOLOGY

## 2023-04-19 PROCEDURE — 87624 HPV HI-RISK TYP POOLED RSLT: CPT | Mod: ,,, | Performed by: CLINICAL MEDICAL LABORATORY

## 2023-04-19 PROCEDURE — 3080F DIAST BP >= 90 MM HG: CPT | Mod: ,,, | Performed by: OBSTETRICS & GYNECOLOGY

## 2023-04-19 PROCEDURE — 36415 COLL VENOUS BLD VENIPUNCTURE: CPT | Mod: PBBFAC | Performed by: OBSTETRICS & GYNECOLOGY

## 2023-04-19 PROCEDURE — 3075F PR MOST RECENT SYSTOLIC BLOOD PRESS GE 130-139MM HG: ICD-10-PCS | Mod: ,,, | Performed by: OBSTETRICS & GYNECOLOGY

## 2023-04-19 PROCEDURE — 1160F PR REVIEW ALL MEDS BY PRESCRIBER/CLIN PHARMACIST DOCUMENTED: ICD-10-PCS | Mod: ,,, | Performed by: OBSTETRICS & GYNECOLOGY

## 2023-04-19 PROCEDURE — 80061 LIPID PANEL: ICD-10-PCS | Mod: ,,, | Performed by: CLINICAL MEDICAL LABORATORY

## 2023-04-19 PROCEDURE — 1160F RVW MEDS BY RX/DR IN RCRD: CPT | Mod: ,,, | Performed by: OBSTETRICS & GYNECOLOGY

## 2023-04-19 PROCEDURE — 87480 BACTERIAL VAGINOSIS: ICD-10-PCS | Mod: ,,, | Performed by: CLINICAL MEDICAL LABORATORY

## 2023-04-19 PROCEDURE — 87660 TRICHOMONAS VAGIN DIR PROBE: CPT | Mod: ,,, | Performed by: CLINICAL MEDICAL LABORATORY

## 2023-04-19 PROCEDURE — 83036 HEMOGLOBIN A1C: ICD-10-PCS | Mod: ,,, | Performed by: CLINICAL MEDICAL LABORATORY

## 2023-04-19 PROCEDURE — 80061 LIPID PANEL: CPT | Mod: ,,, | Performed by: CLINICAL MEDICAL LABORATORY

## 2023-04-19 PROCEDURE — 99396 PR PREVENTIVE VISIT,EST,40-64: ICD-10-PCS | Mod: S$PBB,,, | Performed by: OBSTETRICS & GYNECOLOGY

## 2023-04-19 PROCEDURE — 1159F PR MEDICATION LIST DOCUMENTED IN MEDICAL RECORD: ICD-10-PCS | Mod: ,,, | Performed by: OBSTETRICS & GYNECOLOGY

## 2023-04-19 PROCEDURE — 87591 CHLAMYDIA/GC, PCR (THINPREP): ICD-10-PCS | Mod: ,,, | Performed by: CLINICAL MEDICAL LABORATORY

## 2023-04-19 PROCEDURE — 99396 PREV VISIT EST AGE 40-64: CPT | Mod: S$PBB,,, | Performed by: OBSTETRICS & GYNECOLOGY

## 2023-04-19 PROCEDURE — 87510 BACTERIAL VAGINOSIS: ICD-10-PCS | Mod: ,,, | Performed by: CLINICAL MEDICAL LABORATORY

## 2023-04-19 PROCEDURE — 80053 COMPREHENSIVE METABOLIC PANEL: ICD-10-PCS | Mod: ,,, | Performed by: CLINICAL MEDICAL LABORATORY

## 2023-04-19 PROCEDURE — 3008F PR BODY MASS INDEX (BMI) DOCUMENTED: ICD-10-PCS | Mod: ,,, | Performed by: OBSTETRICS & GYNECOLOGY

## 2023-04-19 PROCEDURE — 87624 HUMAN PAPILLOMAVIRUS (HPV): ICD-10-PCS | Mod: ,,, | Performed by: CLINICAL MEDICAL LABORATORY

## 2023-04-19 PROCEDURE — 82306 VITAMIN D: ICD-10-PCS | Mod: ,,, | Performed by: CLINICAL MEDICAL LABORATORY

## 2023-04-19 PROCEDURE — 87591 N.GONORRHOEAE DNA AMP PROB: CPT | Mod: ,,, | Performed by: CLINICAL MEDICAL LABORATORY

## 2023-04-19 PROCEDURE — 85025 COMPLETE CBC W/AUTO DIFF WBC: CPT | Mod: ,,, | Performed by: CLINICAL MEDICAL LABORATORY

## 2023-04-19 PROCEDURE — 99213 OFFICE O/P EST LOW 20 MIN: CPT | Mod: PBBFAC | Performed by: OBSTETRICS & GYNECOLOGY

## 2023-04-19 PROCEDURE — 82270 OCCULT BLOOD FECES: CPT | Mod: PBBFAC,91 | Performed by: OBSTETRICS & GYNECOLOGY

## 2023-04-19 RX ORDER — ESTRADIOL 2 MG/1
2 TABLET ORAL DAILY
Qty: 90 TABLET | Refills: 3 | Status: SHIPPED | OUTPATIENT
Start: 2023-04-19 | End: 2024-04-18

## 2023-04-19 RX ORDER — FLUCONAZOLE 100 MG/1
100 TABLET ORAL DAILY
Qty: 10 TABLET | Refills: 2 | Status: SHIPPED | OUTPATIENT
Start: 2023-04-19 | End: 2023-04-29

## 2023-04-19 NOTE — PROGRESS NOTES
Leidy Castellano female  for   Chief Complaint   Patient presents with    Gynecologic Exam      OB History          1    Para   1    Term   1            AB        Living   1         SAB        IAB        Ectopic        Multiple        Live Births                      PHI:  53-year-old  1, para 1  female presents for her annual exam.  The patient is on estradiol for hormone supplementation.  The patient did have a hysterectomy in the past due to severe cervical dysplasia.  Her subsequent Pap smears have been unremarkable.  She has no dyspareunia and she has no urologic symptoms.  She does relate occasionally she has some mild stress incontinence but she does not wear any perineal pad for protection.    History reviewed. No pertinent past medical history.   Past Surgical History:   Procedure Laterality Date    HYSTERECTOMY        Review of patient's allergies indicates:  No Known Allergies     ROS:Pertinent items are noted in HPI.    Physical exam:    BP (!) 130/90   Pulse (!) 59   Temp 97.1 °F (36.2 °C)   Resp 16   Ht 5' (1.524 m)   Wt 58.7 kg (129 lb 6.4 oz)   BMI 25.27 kg/m²      General Appearance: healthy, alert, no distress, smiling, wears glasses               HEENT: Head: Normocephalic, no lesions, without obvious abnormality.    Neuro: normal exam    Lymphatic: no palpable lymphadenopathy, no hepatosplenomegaly    Chest:  Patient is chronic but not progressed of pectus excavatum            Breast: normal appearance, no masses or tenderness, Inspection negative, No nipple retraction or dimpling, No nipple discharge or bleeding, No axillary or supraclavicular adenopathy            Lung: chest clear, no wheezing, rales, normal symmetric air entry            Heart: regular rate and rhythm, S1, S2 normal, no murmur, click, rub or gallop    Abdomen: soft, non-tender, without masses or organomegaly and normal bowel sounds    Pelvic:            Vulva: Normal vulva: no lesions,  masses, epithelial changes, or exudate, shaved           Vagina: normal mucosa, no discharge, asymptomatic clumpy white discharge with no odor, well-supported vaginal vault with no urethrocele, no cystocele, no vault prolapse or rectocele           Uterus: Uterus / cervix surgically absent           Adnexae: normal adnexa and no mass, fullness, tenderness    Rectal: negative, stool guaiac negative    Extremity: normal    Skin: normal exam        Assessment:   Problem List Items Addressed This Visit    None  Visit Diagnoses       Encounter for annual routine gynecological examination    -  Primary    Relevant Orders    ThinPrep Pap Test    CBC Auto Differential    Comprehensive Metabolic Panel    Hemoglobin A1C    Lipid Panel    Vitamin D    Urinalysis    POCT occult blood stool (Completed)    Menopause        Relevant Orders    ThinPrep Pap Test    CBC Auto Differential    Comprehensive Metabolic Panel    Hemoglobin A1C    Lipid Panel    Vitamin D    Urinalysis    POCT occult blood stool (Completed)    Vitamin D deficiency        Relevant Orders    ThinPrep Pap Test    CBC Auto Differential    Comprehensive Metabolic Panel    Hemoglobin A1C    Lipid Panel    Vitamin D    Urinalysis    POCT occult blood stool (Completed)    Vaginal discharge        Relevant Orders    Bacterial Vaginosis    Yeast vaginitis        Asymptomatic diagnosed on pelvic exam-clinical exam             Plan:  Screening labs; vitamin-D check; thin prep Pap; hemoccult screen was negative              Recommend monthly breast self-exam and recommend yearly mammography-neck mammogram is due in mid to late June of 2023.            Dr. Deshawn Lopez renew her estradiol therapy for hormone relief.               Since patient does not use any vitamin-D supplementation, Dr. Deshawn Lopez in addition to checking her vitamin-D will place the recommendation for to use over-the-counter vitamin D3 4000 units daily.              The patient is asymptomatic as  regards vaginitis but with the clinical findings Dr. Deshawn Lopez will recommend Diflucan 100 mg daily for 10 days.  Affirm study was performed today.

## 2023-04-19 NOTE — PATIENT INSTRUCTIONS
Dr. Deshawn Lopez thanks you for this office visit at [facility].    Diagnosis for this visit:   Problem List Items Addressed This Visit    None  Visit Diagnoses       Encounter for annual routine gynecological examination    -  Primary    Menopause        Vitamin D deficiency                 The Plan: Screening labs; vitamin-D check; thin prep Pap; hemoccult screen was negative              Recommend monthly breast self-exam and recommend yearly mammography-neck mammogram is due in mid to late June of 2023.            Dr. Deshawn Lopez renew her estradiol therapy for hormone relief.               Since patient does not use any vitamin-D supplementation, Dr. Deshawn Lopez in addition to checking her vitamin-D will place the recommendation for to use over-the-counter vitamin D3 4000 units daily.                                          Please practice best food and exercise habits for your age.    Dr. Deshawn Lopez recommends avoidance of smoking and illicit medications or habits.    Please call  or schedule for any change in your health.     Please keep the next scheduled appointment or call for any need to change the appointment.     Dr. Deshawn Lopez recommends yearly exams for good health maintenance.      Thank you    Dr. Deshawn Lopez  04/19/2023 4:50 PM

## 2023-04-21 LAB
GH SERPL-MCNC: NORMAL NG/ML
INSULIN SERPL-ACNC: NORMAL U[IU]/ML
LAB AP CLINICAL INFORMATION: NORMAL
LAB AP GYN INTERPRETATION: NEGATIVE
LAB AP PAP DISCLAIMER COMMENTS: NORMAL
RENIN PLAS-CCNC: NORMAL NG/ML/H

## 2023-04-24 LAB
CHLAMYDIA BY PCR: NEGATIVE
HPV 16: NEGATIVE
HPV 18: NEGATIVE
HPV OTHER: NEGATIVE
N. GONORRHOEAE (GC) BY PCR: NEGATIVE

## 2023-08-02 ENCOUNTER — HOSPITAL ENCOUNTER (OUTPATIENT)
Dept: RADIOLOGY | Facility: HOSPITAL | Age: 53
Discharge: HOME | End: 2023-08-02
Attending: RADIOLOGY
Payer: COMMERCIAL

## 2023-08-02 DIAGNOSIS — Z12.31 VISIT FOR SCREENING MAMMOGRAM: ICD-10-CM

## 2023-08-02 PROCEDURE — 77067 SCR MAMMO BI INCL CAD: CPT | Mod: TC

## 2023-08-02 PROCEDURE — 77067 MAMMO DIGITAL SCREENING BILAT: ICD-10-PCS | Mod: 26,,, | Performed by: RADIOLOGY

## 2023-08-02 PROCEDURE — 77067 SCR MAMMO BI INCL CAD: CPT | Mod: 26,,, | Performed by: RADIOLOGY

## 2024-04-22 ENCOUNTER — OFFICE VISIT (OUTPATIENT)
Dept: OBSTETRICS AND GYNECOLOGY | Facility: CLINIC | Age: 54
End: 2024-04-22
Payer: COMMERCIAL

## 2024-04-22 VITALS
BODY MASS INDEX: 24.97 KG/M2 | RESPIRATION RATE: 18 BRPM | HEIGHT: 60 IN | HEART RATE: 88 BPM | WEIGHT: 127.19 LBS | SYSTOLIC BLOOD PRESSURE: 157 MMHG | DIASTOLIC BLOOD PRESSURE: 78 MMHG | OXYGEN SATURATION: 98 %

## 2024-04-22 DIAGNOSIS — Z01.419 ENCOUNTER FOR ANNUAL ROUTINE GYNECOLOGICAL EXAMINATION: ICD-10-CM

## 2024-04-22 DIAGNOSIS — R53.83 FATIGUE, UNSPECIFIED TYPE: Primary | ICD-10-CM

## 2024-04-22 DIAGNOSIS — Z78.0 MENOPAUSE: ICD-10-CM

## 2024-04-22 DIAGNOSIS — E55.9 VITAMIN D DEFICIENCY: ICD-10-CM

## 2024-04-22 LAB
BILIRUB UR QL STRIP: NEGATIVE
CLARITY UR: CLEAR
COLOR UR: ABNORMAL
GLUCOSE UR STRIP-MCNC: NORMAL MG/DL
KETONES UR STRIP-SCNC: NEGATIVE MG/DL
LEUKOCYTE ESTERASE UR QL STRIP: NEGATIVE
MUCOUS, UA: ABNORMAL /LPF
NITRITE UR QL STRIP: NEGATIVE
PH UR STRIP: 5.5 PH UNITS
PROT UR QL STRIP: NEGATIVE
RBC # UR STRIP: ABNORMAL /UL
RBC #/AREA URNS HPF: 3 /HPF
SP GR UR STRIP: 1.02
SQUAMOUS #/AREA URNS LPF: ABNORMAL /HPF
UROBILINOGEN UR STRIP-ACNC: NORMAL MG/DL
WBC #/AREA URNS HPF: 2 /HPF

## 2024-04-22 PROCEDURE — 88142 CYTOPATH C/V THIN LAYER: CPT | Mod: TC,GCY | Performed by: OBSTETRICS & GYNECOLOGY

## 2024-04-22 PROCEDURE — 3008F BODY MASS INDEX DOCD: CPT | Mod: ,,, | Performed by: OBSTETRICS & GYNECOLOGY

## 2024-04-22 PROCEDURE — 99999PBSHW PR PBB SHADOW TECHNICAL ONLY FILED TO HB: Mod: PBBFAC,,,

## 2024-04-22 PROCEDURE — 81001 URINALYSIS AUTO W/SCOPE: CPT | Mod: ,,, | Performed by: CLINICAL MEDICAL LABORATORY

## 2024-04-22 PROCEDURE — 3078F DIAST BP <80 MM HG: CPT | Mod: ,,, | Performed by: OBSTETRICS & GYNECOLOGY

## 2024-04-22 PROCEDURE — 99459 PELVIC EXAMINATION: CPT | Mod: S$PBB,,, | Performed by: OBSTETRICS & GYNECOLOGY

## 2024-04-22 PROCEDURE — 99214 OFFICE O/P EST MOD 30 MIN: CPT | Mod: PBBFAC | Performed by: OBSTETRICS & GYNECOLOGY

## 2024-04-22 PROCEDURE — 1159F MED LIST DOCD IN RCRD: CPT | Mod: ,,, | Performed by: OBSTETRICS & GYNECOLOGY

## 2024-04-22 PROCEDURE — 82270 OCCULT BLOOD FECES: CPT | Mod: PBBFAC | Performed by: OBSTETRICS & GYNECOLOGY

## 2024-04-22 PROCEDURE — 99386 PREV VISIT NEW AGE 40-64: CPT | Mod: S$PBB,,, | Performed by: OBSTETRICS & GYNECOLOGY

## 2024-04-22 PROCEDURE — 1160F RVW MEDS BY RX/DR IN RCRD: CPT | Mod: ,,, | Performed by: OBSTETRICS & GYNECOLOGY

## 2024-04-22 PROCEDURE — 3077F SYST BP >= 140 MM HG: CPT | Mod: ,,, | Performed by: OBSTETRICS & GYNECOLOGY

## 2024-04-22 PROCEDURE — 99459 PELVIC EXAMINATION: CPT | Mod: PBBFAC | Performed by: OBSTETRICS & GYNECOLOGY

## 2024-04-22 RX ORDER — ESTRADIOL 2 MG/1
2 TABLET ORAL DAILY
Qty: 90 TABLET | Refills: 3 | Status: SHIPPED | OUTPATIENT
Start: 2024-04-22 | End: 2025-04-22

## 2024-04-22 NOTE — PATIENT INSTRUCTIONS
Dr. Deshawn Lopez thanks you for this office visit at [facility].    Diagnosis for this visit:   Problem List Items Addressed This Visit    None  Visit Diagnoses       Fatigue, unspecified type    -  Primary    Relevant Orders    ThinPrep Pap Test    POCT occult blood stool    CBC Auto Differential    Comprehensive Metabolic Panel    Lipid Panel    Vitamin D    Urinalysis, Reflex to Urine Culture    Thyroid Panel    Sedimentation Rate    Hemoglobin A1C    Vitamin B12 & Folate    Menopause        Relevant Orders    ThinPrep Pap Test    POCT occult blood stool    CBC Auto Differential    Comprehensive Metabolic Panel    Lipid Panel    Vitamin D    Urinalysis, Reflex to Urine Culture    Thyroid Panel    Sedimentation Rate    Hemoglobin A1C    Vitamin B12 & Folate    Encounter for annual routine gynecological examination        Relevant Orders    ThinPrep Pap Test    POCT occult blood stool    CBC Auto Differential    Comprehensive Metabolic Panel    Lipid Panel    Vitamin D    Urinalysis, Reflex to Urine Culture    Thyroid Panel    Sedimentation Rate    Hemoglobin A1C    Vitamin B12 & Folate    Vitamin D deficiency        Relevant Orders    ThinPrep Pap Test    POCT occult blood stool    CBC Auto Differential    Comprehensive Metabolic Panel    Lipid Panel    Vitamin D    Urinalysis, Reflex to Urine Culture    Thyroid Panel    Sedimentation Rate    Hemoglobin A1C    Vitamin B12 & Folate             The Plan: Thin prep Pap due to history of 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      Please practice  best food and exercise habits for your age.    Dr. Deshawn Lopez recommends avoidance of smoking and illicit medications or habits.    Please call  or schedule for any change in your health.     Please keep the next scheduled appointment or call for any need to change the appointment.     Dr. Deshawn Lopez recommends yearly exams for good health maintenance.      Thank you    Dr. Deshawn Lopez  04/22/2024 2:27 PM

## 2024-04-22 NOTE — PROGRESS NOTES
Leidy Castellano female  for   Chief Complaint   Patient presents with    Annual Exam     Patient is here for a annual exam, as per patient she has no issues going on at this time. Added She will need a refill on the  rx [employer].     Voices fatigue as a problem this year      OB History          1    Para   1    Term   1            AB        Living   1         SAB        IAB        Ectopic        Multiple        Live Births                      PHI:  Patient is 54 years of age  1, para 1 who has had a LAVH in the past without BSO.  Patient takes estradiol 2 mg daily.  She denies hot flashes.  Patient denies vaginal dryness.  The patient denies dyspareunia.    Patient denies overactive bladder symptoms; she denies nocturia.  Patient denies UTI symptoms.    Her main complaint is a fatigue.  She denies depression.  She does relate she takes vitamin D supplementation.  Review of her weight indicates stable weight.  There is no weight loss.    Her last mammogram was 2023 in the mammogram was reported as a bi rad 1.    No past medical history on file.   Past Surgical History:   Procedure Laterality Date    HYSTERECTOMY        Review of patient's allergies indicates:  No Known Allergies     ROS:Pertinent items are noted in HPI.    Physical exam:This gyn related exam was chaperoned by a female medical assistant.      BP (!) 157/78 (BP Location: Left arm, Patient Position: Sitting)   Pulse 88   Resp 18   Ht 5' (1.524 m)   Wt 57.7 kg (127 lb 3.2 oz)   SpO2 98%   BMI 24.84 kg/m²      General Appearance: healthy, alert, no distress, smiling,                 HEENT: Head: Normocephalic, no lesions, without obvious abnormality.  Patient does wear bifocals; looking at the eyelids there is no evidence of any lack of pink color-no evidence of an obvious anemia to explain the fatigue.    Neuro: normal exam    Lymphatic: no palpable lymphadenopathy, no hepatosplenomegaly    Chest:             Breast: normal appearance, no masses or tenderness, Inspection negative, No nipple retraction or dimpling, No nipple discharge or bleeding, No axillary or supraclavicular adenopathy, Normal to palpation without dominant masses, minor symmetrical fibrocystic breast changes with no dominance on either breast; no evidence supraclavicular lymphadenopathy and no axillary lymphadenopathy on exam.            Lung: chest clear, no wheezing, rales, normal symmetric air entry            Heart: regular rate and rhythm, S1, S2 normal, no murmur, click, rub or gallop    Abdomen: soft, non-tender, without masses or organomegaly, normal bowel sounds, without guarding, without rebound, and well-healed old laparoscopic scars from her LAVH; there is no abdominal bruit on auscultation; no ascites    Pelvic:            Vulva: Normal vulva: no lesions, masses, epithelial changes, or exudate           Vagina: normal mucosa, no discharge, atrophic, no evidence of pelvic relaxation           Uterus: Uterus / cervix surgically absent           Adnexae: no mass, fullness, tenderness and adnexae not palpable    Rectal: negative, stool guaiac negative    Extremity: normal, extremities warm, no clubbing, no cyanosis, no edema, non-tender; no CVA tenderness    Skin: normal exam  Psych and neuro:  Within normal limits no evidence of depression on exam.      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

## 2024-04-23 ENCOUNTER — TELEPHONE (OUTPATIENT)
Dept: OBSTETRICS AND GYNECOLOGY | Facility: CLINIC | Age: 54
End: 2024-04-23
Payer: COMMERCIAL

## 2024-04-23 NOTE — TELEPHONE ENCOUNTER
----- Message from Deshawn Lopez MD sent at 4/23/2024  7:34 AM CDT -----  Regarding: Labs from yesterday  Call patient and tell her all current labs are appear normal.  This includes a normal vitamin-D level, normal vitamin B12 level and a normal thyroid level.  Her fatigue currently is unexplained.    Called patient and let her know as mentioned above, patient verbally understood .

## 2024-09-18 ENCOUNTER — HOSPITAL ENCOUNTER (OUTPATIENT)
Dept: RADIOLOGY | Facility: HOSPITAL | Age: 54
Discharge: HOME | End: 2024-09-18
Payer: COMMERCIAL

## 2024-09-18 DIAGNOSIS — Z12.31 VISIT FOR SCREENING MAMMOGRAM: ICD-10-CM

## 2024-09-18 PROCEDURE — 77067 SCR MAMMO BI INCL CAD: CPT | Mod: 26,,, | Performed by: RADIOLOGY

## 2024-09-18 PROCEDURE — 77063 BREAST TOMOSYNTHESIS BI: CPT | Mod: TC

## 2024-09-18 PROCEDURE — 77063 BREAST TOMOSYNTHESIS BI: CPT | Mod: 26,,, | Performed by: RADIOLOGY
